# Patient Record
Sex: MALE | Race: WHITE | URBAN - METROPOLITAN AREA
[De-identification: names, ages, dates, MRNs, and addresses within clinical notes are randomized per-mention and may not be internally consistent; named-entity substitution may affect disease eponyms.]

---

## 2020-07-01 ENCOUNTER — OFFICE VISIT (OUTPATIENT)
Dept: URBAN - METROPOLITAN AREA CLINIC 27 | Facility: CLINIC | Age: 70
End: 2020-07-01

## 2020-07-10 ENCOUNTER — LAB OUTSIDE AN ENCOUNTER (OUTPATIENT)
Dept: URBAN - METROPOLITAN AREA CLINIC 121 | Facility: CLINIC | Age: 70
End: 2020-07-10

## 2020-07-10 LAB
BASOPH COUNT: (no result)
BASOPHIL %: 0.4
EOS COUNT: (no result)
EOSINOPHIL %: 1.6
HCT: 49.7
HGB: 16.7
HGBA1C: (no result)
LYMPHS %: 13.2
MCH: 29.1
MCHC: (no result)
MCV: 86.7
MONOCYTE %: 7.2
MONOSCT AUTO: (no result)
PLATELETS: (no result)
PMN %: 77.6
RBC: (no result)
RDW: 13.1
WBC: (no result)
ZZ-GE-UNK: (no result)

## 2020-07-17 ENCOUNTER — OFFICE VISIT (OUTPATIENT)
Dept: URBAN - METROPOLITAN AREA MEDICAL CENTER 8 | Facility: MEDICAL CENTER | Age: 70
End: 2020-07-17

## 2020-08-17 ENCOUNTER — OFFICE VISIT (OUTPATIENT)
Dept: URBAN - METROPOLITAN AREA CLINIC 27 | Facility: CLINIC | Age: 70
End: 2020-08-17

## 2021-08-28 ENCOUNTER — TELEPHONE ENCOUNTER (OUTPATIENT)
Dept: URBAN - METROPOLITAN AREA CLINIC 13 | Facility: CLINIC | Age: 71
End: 2021-08-28

## 2021-08-29 ENCOUNTER — TELEPHONE ENCOUNTER (OUTPATIENT)
Dept: URBAN - METROPOLITAN AREA CLINIC 13 | Facility: CLINIC | Age: 71
End: 2021-08-29

## 2022-04-30 ENCOUNTER — TELEPHONE ENCOUNTER (OUTPATIENT)
Dept: URBAN - METROPOLITAN AREA CLINIC 121 | Facility: CLINIC | Age: 72
End: 2022-04-30

## 2022-04-30 RX ORDER — OMEPRAZOLE 40 MG/1
1 CAPSULE PO QD CAPSULE, DELAYED RELEASE ORAL
OUTPATIENT
Start: 2014-02-25 | End: 2014-08-26

## 2022-04-30 RX ORDER — METOCLOPRAMIDE HYDROCHLORIDE 5 MG/5ML
SOLUTION ORAL
OUTPATIENT
Start: 2010-03-11

## 2022-04-30 RX ORDER — METOCLOPRAMIDE HYDROCHLORIDE 5 MG/5ML
SOLUTION ORAL
OUTPATIENT
Start: 2016-06-02

## 2022-04-30 RX ORDER — CHLORHEXIDINE GLUCONATE 4 %
1 TABLET PO BID LIQUID (ML) TOPICAL
OUTPATIENT
Start: 2014-04-08

## 2022-04-30 RX ORDER — OMEPRAZOLE 40 MG/1
1 CAPSULE PO QD CAPSULE, DELAYED RELEASE ORAL
OUTPATIENT
Start: 2014-02-25

## 2022-04-30 RX ORDER — ESOMEPRAZOLE MAGNESIUM 40 MG
TAKE 1 CAP PO QD CAPSULE,DELAYED RELEASE (ENTERIC COATED) ORAL
OUTPATIENT
Start: 2014-02-17 | End: 2014-08-26

## 2022-04-30 RX ORDER — CLOTRIMAZOLE AND BETAMETHASONE DIPROPIONATE 10; .5 MG/G; MG/G
CREAM TOPICAL
OUTPATIENT
Start: 2016-06-02 | End: 2019-03-21

## 2022-04-30 RX ORDER — SUCRALFATE 1 G/1
QID TABLET ORAL
OUTPATIENT
Start: 2013-03-21

## 2022-04-30 RX ORDER — CHLORHEXIDINE GLUCONATE 4 %
TAKE 1 TABLET BY MOUTH TWICE DAILY LIQUID (ML) TOPICAL
OUTPATIENT
Start: 2013-06-13 | End: 2014-08-26

## 2022-04-30 RX ORDER — PROMETHAZINE HYDROCHLORIDE 25 MG/1
1 TABLET PO Q 6 HOURS PRN TABLET ORAL
OUTPATIENT
Start: 2014-08-26 | End: 2014-09-23

## 2022-04-30 RX ORDER — PROMETHAZINE HYDROCHLORIDE 25 MG/1
1 TABLET PO Q6H PRN TABLET ORAL
OUTPATIENT
Start: 2015-07-21

## 2022-04-30 RX ORDER — METOCLOPRAMIDE HYDROCHLORIDE 5 MG/5ML
QAC SOLUTION ORAL
OUTPATIENT
Start: 2013-03-21

## 2022-04-30 RX ORDER — ESOMEPRAZOLE MAGNESIUM 40 MG
QD CAPSULE,DELAYED RELEASE (ENTERIC COATED) ORAL
OUTPATIENT
Start: 2013-03-21 | End: 2014-08-26

## 2022-04-30 RX ORDER — CHLORHEXIDINE GLUCONATE 4 %
1 TABLET PO BID LIQUID (ML) TOPICAL
OUTPATIENT
Start: 2014-04-08 | End: 2014-09-23

## 2022-04-30 RX ORDER — PROMETHAZINE HYDROCHLORIDE 25 MG/1
1 TABLET PO Q 6 HOURS PRN TABLET ORAL
OUTPATIENT
Start: 2014-08-26

## 2022-04-30 RX ORDER — ESOMEPRAZOLE MAGNESIUM 40 MG
QD CAPSULE,DELAYED RELEASE (ENTERIC COATED) ORAL
OUTPATIENT
Start: 2013-03-21

## 2022-04-30 RX ORDER — METOCLOPRAMIDE HYDROCHLORIDE 5 MG/5ML
QAC SOLUTION ORAL
OUTPATIENT
Start: 2013-03-21 | End: 2014-08-26

## 2022-04-30 RX ORDER — OMEPRAZOLE 40 MG/1
1 CAPSULE PO QD CAPSULE, DELAYED RELEASE ORAL
OUTPATIENT
Start: 2014-08-08

## 2022-04-30 RX ORDER — SUCRALFATE 1 G/1
QID TABLET ORAL
OUTPATIENT
Start: 2013-03-21 | End: 2014-09-23

## 2022-04-30 RX ORDER — OMEPRAZOLE 40 MG/1
1 CAPSULE PO QD CAPSULE, DELAYED RELEASE ORAL
OUTPATIENT
Start: 2014-08-08 | End: 2014-08-26

## 2022-04-30 RX ORDER — ESOMEPRAZOLE MAGNESIUM 40 MG
CAPSULE,DELAYED RELEASE (ENTERIC COATED) ORAL
OUTPATIENT
Start: 2010-03-11

## 2022-04-30 RX ORDER — CHLORHEXIDINE GLUCONATE 4 %
TAKE 1 TABLET BY MOUTH TWICE DAILY LIQUID (ML) TOPICAL
OUTPATIENT
Start: 2013-06-13

## 2022-04-30 RX ORDER — SUCRALFATE 1 G/1
TABLET ORAL
OUTPATIENT
Start: 2010-03-11

## 2022-04-30 RX ORDER — CLOTRIMAZOLE AND BETAMETHASONE DIPROPIONATE 10; .5 MG/G; MG/G
CREAM TOPICAL
OUTPATIENT
Start: 2016-06-02

## 2022-04-30 RX ORDER — CHLORHEXIDINE GLUCONATE 4 %
1 TABLET PO BID LIQUID (ML) TOPICAL
OUTPATIENT
Start: 2013-12-24 | End: 2014-08-26

## 2022-04-30 RX ORDER — CHLORHEXIDINE GLUCONATE 4 %
1 TABLET PO BID LIQUID (ML) TOPICAL
OUTPATIENT
Start: 2013-12-24

## 2022-04-30 RX ORDER — ESOMEPRAZOLE MAGNESIUM 40 MG
TAKE 1 CAP PO QD CAPSULE,DELAYED RELEASE (ENTERIC COATED) ORAL
OUTPATIENT
Start: 2014-02-17

## 2022-04-30 RX ORDER — BISOPROLOL FUMARATE 5 MG/1
TABLET ORAL
OUTPATIENT
Start: 2010-03-11

## 2022-05-01 ENCOUNTER — TELEPHONE ENCOUNTER (OUTPATIENT)
Dept: URBAN - METROPOLITAN AREA CLINIC 121 | Facility: CLINIC | Age: 72
End: 2022-05-01

## 2022-05-01 RX ORDER — PANTOPRAZOLE SODIUM 40 MG/1
TAKE 1 TABLET BY MOUTH TWICE A DAY TABLET, DELAYED RELEASE ORAL
Status: ACTIVE | COMMUNITY
Start: 2019-10-12

## 2022-05-01 RX ORDER — PROMETHAZINE HYDROCHLORIDE 25 MG/1
1 TABLET PO Q6H PRN TABLET ORAL
Status: ACTIVE | COMMUNITY
Start: 2015-07-21

## 2022-05-01 RX ORDER — SUCRALFATE 1 G/1
TABLET ORAL
Status: ACTIVE | COMMUNITY
Start: 2019-03-21

## 2022-05-01 RX ORDER — BISOPROLOL FUMARATE 5 MG/1
QD TABLET ORAL
Status: ACTIVE | COMMUNITY
Start: 2013-03-21

## 2022-05-01 RX ORDER — ELECTROLYTES/DEXTROSE
SOLUTION, ORAL ORAL
Status: ACTIVE | COMMUNITY
Start: 2016-06-02

## 2022-05-10 ENCOUNTER — CLAIMS CREATED FROM THE CLAIM WINDOW (OUTPATIENT)
Dept: URBAN - METROPOLITAN AREA MEDICAL CENTER 8 | Facility: MEDICAL CENTER | Age: 72
End: 2022-05-10
Payer: MEDICARE

## 2022-05-10 DIAGNOSIS — R93.3 ABN FINDINGS-GI TRACT: ICD-10-CM

## 2022-05-10 DIAGNOSIS — R19.7 ACUTE DIARRHEA: ICD-10-CM

## 2022-05-10 DIAGNOSIS — K63.89 BACTERIAL OVERGROWTH SYNDROME: ICD-10-CM

## 2022-05-10 DIAGNOSIS — D50.9 ANEMIA: ICD-10-CM

## 2022-05-10 DIAGNOSIS — R10.84 ABDOMINAL CRAMPING, GENERALIZED: ICD-10-CM

## 2022-05-10 PROCEDURE — 99221 1ST HOSP IP/OBS SF/LOW 40: CPT | Performed by: INTERNAL MEDICINE

## 2022-05-10 PROCEDURE — 99232 SBSQ HOSP IP/OBS MODERATE 35: CPT | Performed by: INTERNAL MEDICINE

## 2022-05-10 PROCEDURE — G8430 EC AT DOC MEDREC PT NOT ELIG: HCPCS | Performed by: INTERNAL MEDICINE

## 2022-05-12 ENCOUNTER — OUT OF OFFICE VISIT (OUTPATIENT)
Dept: URBAN - METROPOLITAN AREA MEDICAL CENTER 8 | Facility: MEDICAL CENTER | Age: 72
End: 2022-05-12
Payer: MEDICARE

## 2022-05-12 DIAGNOSIS — K63.89 BACTERIAL OVERGROWTH SYNDROME: ICD-10-CM

## 2022-05-12 DIAGNOSIS — D50.9 ANEMIA: ICD-10-CM

## 2022-05-12 PROCEDURE — 99232 SBSQ HOSP IP/OBS MODERATE 35: CPT | Performed by: INTERNAL MEDICINE

## 2022-05-13 ENCOUNTER — CLAIMS CREATED FROM THE CLAIM WINDOW (OUTPATIENT)
Dept: URBAN - METROPOLITAN AREA MEDICAL CENTER 8 | Facility: MEDICAL CENTER | Age: 72
End: 2022-05-13
Payer: MEDICARE

## 2022-05-13 DIAGNOSIS — K20.80 ESOPHAGITIS DISSECANS SUPERFICIALIS: ICD-10-CM

## 2022-05-13 DIAGNOSIS — K92.0 BLOODY EMESIS: ICD-10-CM

## 2022-05-13 DIAGNOSIS — K31.7 BENIGN GASTRIC POLYP: ICD-10-CM

## 2022-05-13 PROCEDURE — 43239 EGD BIOPSY SINGLE/MULTIPLE: CPT | Performed by: INTERNAL MEDICINE

## 2022-05-14 ENCOUNTER — CLAIMS CREATED FROM THE CLAIM WINDOW (OUTPATIENT)
Dept: URBAN - METROPOLITAN AREA MEDICAL CENTER 8 | Facility: MEDICAL CENTER | Age: 72
End: 2022-05-14
Payer: MEDICARE

## 2022-05-14 DIAGNOSIS — D50.9 ANEMIA: ICD-10-CM

## 2022-05-14 DIAGNOSIS — K20.80 ESOPHAGITIS DISSECANS SUPERFICIALIS: ICD-10-CM

## 2022-05-14 DIAGNOSIS — K63.89 BACTERIAL OVERGROWTH SYNDROME: ICD-10-CM

## 2022-05-14 PROCEDURE — 99232 SBSQ HOSP IP/OBS MODERATE 35: CPT | Performed by: INTERNAL MEDICINE

## 2022-05-26 ENCOUNTER — TELEPHONE ENCOUNTER (OUTPATIENT)
Dept: URBAN - METROPOLITAN AREA CLINIC 27 | Facility: CLINIC | Age: 72
End: 2022-05-26

## 2022-06-01 ENCOUNTER — OFFICE VISIT (OUTPATIENT)
Dept: URBAN - METROPOLITAN AREA CLINIC 27 | Facility: CLINIC | Age: 72
End: 2022-06-01
Payer: MEDICARE

## 2022-06-01 ENCOUNTER — LAB OUTSIDE AN ENCOUNTER (OUTPATIENT)
Dept: URBAN - METROPOLITAN AREA CLINIC 27 | Facility: CLINIC | Age: 72
End: 2022-06-01

## 2022-06-01 ENCOUNTER — WEB ENCOUNTER (OUTPATIENT)
Dept: URBAN - METROPOLITAN AREA CLINIC 27 | Facility: CLINIC | Age: 72
End: 2022-06-01

## 2022-06-01 VITALS
HEART RATE: 97 BPM | SYSTOLIC BLOOD PRESSURE: 99 MMHG | BODY MASS INDEX: 18.66 KG/M2 | TEMPERATURE: 97.1 F | RESPIRATION RATE: 18 BRPM | DIASTOLIC BLOOD PRESSURE: 75 MMHG | HEIGHT: 69 IN | WEIGHT: 126 LBS

## 2022-06-01 DIAGNOSIS — D50.8 ACQUIRED IRON DEFICIENCY ANEMIA DUE TO DECREASED ABSORPTION: ICD-10-CM

## 2022-06-01 DIAGNOSIS — K20.90 ESOPHAGITIS: ICD-10-CM

## 2022-06-01 DIAGNOSIS — K52.9 COLITIS: ICD-10-CM

## 2022-06-01 PROCEDURE — 99214 OFFICE O/P EST MOD 30 MIN: CPT | Performed by: INTERNAL MEDICINE

## 2022-06-01 RX ORDER — PROMETHAZINE HYDROCHLORIDE 25 MG/1
1 TABLET PO Q6H PRN TABLET ORAL
Status: ACTIVE | COMMUNITY
Start: 2015-07-21

## 2022-06-01 RX ORDER — BISOPROLOL FUMARATE 5 MG/1
QD TABLET ORAL
Status: ACTIVE | COMMUNITY
Start: 2013-03-21

## 2022-06-01 RX ORDER — SUCRALFATE 1 G/1
TABLET ORAL
Status: ACTIVE | COMMUNITY
Start: 2019-03-21

## 2022-06-01 RX ORDER — PANTOPRAZOLE SODIUM 40 MG/1
TAKE 1 TABLET BY MOUTH TWICE A DAY TABLET, DELAYED RELEASE ORAL
Status: ACTIVE | COMMUNITY
Start: 2019-10-12

## 2022-06-01 RX ORDER — ELECTROLYTES/DEXTROSE
SOLUTION, ORAL ORAL
Status: ACTIVE | COMMUNITY
Start: 2016-06-02

## 2022-06-01 NOTE — HPI-TODAY'S VISIT:
Patient is a 72 YOM established patient of Dr. Starkey presenting for follow up after hospitalization last month with marked iron deficiency anemia, acute diarrhea and colitis on CT (distal colon, rectum). He required transfusion of PRBCs; stool was Guaiac negative. He had no further diarrhea, so stool studies were not sent. He developed hematemesis and underwent EGD showing esophagitis. On discharge his Hgb was 10.9, and he was sent with PPI and PO iron with plans for outpt colonoscopy.  It has been 2 wks since discharge. He feels well but is tired. No diarrhea; he has been constipated, is using suppositories and laxatives. No melena or hematochezia. He is taking pantoprazole, Carafate and Reglan (?reason). Has not had significant N/V lately.   Colonoscopy 2016: suboptimal prep; no large lesions

## 2022-06-03 LAB
% SATURATION: 8
FERRITIN: 8
HEMATOCRIT: 42.3
HEMOGLOBIN: 12.4
IRON BINDING CAPACITY: 332
IRON, TOTAL: 26
MCH: 23.9
MCHC: 29.3
MCV: 81.7
MPV: 11.1
PLATELET COUNT: 366
RDW: 23.7
RED BLOOD CELL COUNT: 5.18
WHITE BLOOD CELL COUNT: 5.4

## 2022-06-27 PROBLEM — 87522002: Status: ACTIVE | Noted: 2022-06-01

## 2022-07-11 ENCOUNTER — OFFICE VISIT (OUTPATIENT)
Dept: URBAN - METROPOLITAN AREA MEDICAL CENTER 8 | Facility: MEDICAL CENTER | Age: 72
End: 2022-07-11

## 2022-07-13 ENCOUNTER — WEB ENCOUNTER (OUTPATIENT)
Dept: URBAN - METROPOLITAN AREA CLINIC 27 | Facility: CLINIC | Age: 72
End: 2022-07-13

## 2022-07-18 ENCOUNTER — OFFICE VISIT (OUTPATIENT)
Dept: URBAN - METROPOLITAN AREA MEDICAL CENTER 8 | Facility: MEDICAL CENTER | Age: 72
End: 2022-07-18
Payer: MEDICARE

## 2022-07-18 DIAGNOSIS — D50.9 ANEMIA: ICD-10-CM

## 2022-07-18 PROCEDURE — 45378 DIAGNOSTIC COLONOSCOPY: CPT | Performed by: INTERNAL MEDICINE

## 2022-07-18 RX ORDER — BISOPROLOL FUMARATE 5 MG/1
QD TABLET ORAL
Status: ACTIVE | COMMUNITY
Start: 2013-03-21

## 2022-07-18 RX ORDER — ELECTROLYTES/DEXTROSE
SOLUTION, ORAL ORAL
Status: ACTIVE | COMMUNITY
Start: 2016-06-02

## 2022-07-18 RX ORDER — PANTOPRAZOLE SODIUM 40 MG/1
TAKE 1 TABLET BY MOUTH TWICE A DAY TABLET, DELAYED RELEASE ORAL
Status: ACTIVE | COMMUNITY
Start: 2019-10-12

## 2022-07-18 RX ORDER — PROMETHAZINE HYDROCHLORIDE 25 MG/1
1 TABLET PO Q6H PRN TABLET ORAL
Status: ACTIVE | COMMUNITY
Start: 2015-07-21

## 2022-07-18 RX ORDER — SUCRALFATE 1 G/1
TABLET ORAL
Status: ACTIVE | COMMUNITY
Start: 2019-03-21

## 2022-08-01 ENCOUNTER — WEB ENCOUNTER (OUTPATIENT)
Dept: URBAN - METROPOLITAN AREA CLINIC 27 | Facility: CLINIC | Age: 72
End: 2022-08-01

## 2022-08-20 ENCOUNTER — OUT OF OFFICE VISIT (OUTPATIENT)
Dept: URBAN - METROPOLITAN AREA MEDICAL CENTER 12 | Facility: MEDICAL CENTER | Age: 72
End: 2022-08-20
Payer: MEDICARE

## 2022-08-20 DIAGNOSIS — R93.3 ABN FINDINGS-GI TRACT: ICD-10-CM

## 2022-08-20 DIAGNOSIS — A41.89 OTHER SPECIFIED SEPSIS: ICD-10-CM

## 2022-08-20 DIAGNOSIS — K92.0 BLOODY EMESIS: ICD-10-CM

## 2022-08-20 DIAGNOSIS — K31.84 DIABETIC GASTROPARESIS: ICD-10-CM

## 2022-08-20 PROCEDURE — G8427 DOCREV CUR MEDS BY ELIG CLIN: HCPCS | Performed by: INTERNAL MEDICINE

## 2022-08-20 PROCEDURE — 99232 SBSQ HOSP IP/OBS MODERATE 35: CPT | Performed by: INTERNAL MEDICINE

## 2022-08-20 PROCEDURE — 99222 1ST HOSP IP/OBS MODERATE 55: CPT | Performed by: INTERNAL MEDICINE

## 2022-08-22 ENCOUNTER — OUT OF OFFICE VISIT (OUTPATIENT)
Dept: URBAN - METROPOLITAN AREA MEDICAL CENTER 12 | Facility: MEDICAL CENTER | Age: 72
End: 2022-08-22
Payer: MEDICARE

## 2022-08-22 DIAGNOSIS — K31.89 ACQUIRED DEFORMITY OF DUODENUM: ICD-10-CM

## 2022-08-22 DIAGNOSIS — K21.00 ALKALINE REFLUX ESOPHAGITIS: ICD-10-CM

## 2022-08-22 DIAGNOSIS — K92.0 BLOODY EMESIS: ICD-10-CM

## 2022-08-22 PROCEDURE — 43239 EGD BIOPSY SINGLE/MULTIPLE: CPT | Performed by: INTERNAL MEDICINE

## 2022-08-22 PROCEDURE — 43251 EGD REMOVE LESION SNARE: CPT | Performed by: INTERNAL MEDICINE

## 2022-08-23 ENCOUNTER — OUT OF OFFICE VISIT (OUTPATIENT)
Dept: URBAN - METROPOLITAN AREA MEDICAL CENTER 12 | Facility: MEDICAL CENTER | Age: 72
End: 2022-08-23
Payer: MEDICARE

## 2022-08-23 DIAGNOSIS — K92.0 BLOODY EMESIS: ICD-10-CM

## 2022-08-23 DIAGNOSIS — A41.89 OTHER SPECIFIED SEPSIS: ICD-10-CM

## 2022-08-23 DIAGNOSIS — K31.84 DIABETIC GASTROPARESIS: ICD-10-CM

## 2022-08-23 DIAGNOSIS — K20.80 ESOPHAGITIS DISSECANS SUPERFICIALIS: ICD-10-CM

## 2022-08-23 PROCEDURE — 99233 SBSQ HOSP IP/OBS HIGH 50: CPT | Performed by: PHYSICIAN ASSISTANT

## 2022-09-01 ENCOUNTER — WEB ENCOUNTER (OUTPATIENT)
Dept: URBAN - METROPOLITAN AREA CLINIC 27 | Facility: CLINIC | Age: 72
End: 2022-09-01

## 2022-09-02 ENCOUNTER — LAB OUTSIDE AN ENCOUNTER (OUTPATIENT)
Dept: URBAN - METROPOLITAN AREA CLINIC 27 | Facility: CLINIC | Age: 72
End: 2022-09-02

## 2022-09-02 ENCOUNTER — OFFICE VISIT (OUTPATIENT)
Dept: URBAN - METROPOLITAN AREA CLINIC 27 | Facility: CLINIC | Age: 72
End: 2022-09-02
Payer: MEDICARE

## 2022-09-02 ENCOUNTER — TELEPHONE ENCOUNTER (OUTPATIENT)
Dept: URBAN - METROPOLITAN AREA CLINIC 27 | Facility: CLINIC | Age: 72
End: 2022-09-02

## 2022-09-02 VITALS
RESPIRATION RATE: 18 BRPM | TEMPERATURE: 96.6 F | DIASTOLIC BLOOD PRESSURE: 62 MMHG | HEIGHT: 69 IN | BODY MASS INDEX: 18.66 KG/M2 | SYSTOLIC BLOOD PRESSURE: 87 MMHG | HEART RATE: 97 BPM | WEIGHT: 126 LBS

## 2022-09-02 DIAGNOSIS — K25.9 GASTRIC ULCER, UNSPECIFIED AS ACUTE OR CHRONIC, WITHOUT HEMORRHAGE OR PERFORATION: ICD-10-CM

## 2022-09-02 DIAGNOSIS — K21.9 GASTRO-ESOPHAGEAL REFLUX DISEASE WITHOUT ESOPHAGITIS: ICD-10-CM

## 2022-09-02 DIAGNOSIS — K59.00 CONSTIPATION, UNSPECIFIED: ICD-10-CM

## 2022-09-02 DIAGNOSIS — G80.9 CEREBRAL PALSY, UNSPECIFIED: ICD-10-CM

## 2022-09-02 PROBLEM — 14760008 CONSTIPATION: Status: ACTIVE | Noted: 2020-08-17

## 2022-09-02 PROCEDURE — 99214 OFFICE O/P EST MOD 30 MIN: CPT | Performed by: INTERNAL MEDICINE

## 2022-09-02 RX ORDER — PROMETHAZINE HYDROCHLORIDE 25 MG/1
1 TABLET PO Q6H PRN TABLET ORAL
Status: ACTIVE | COMMUNITY
Start: 2015-07-21

## 2022-09-02 RX ORDER — SUCRALFATE 1 G/1
TABLET ORAL
Status: ACTIVE | COMMUNITY
Start: 2019-03-21

## 2022-09-02 RX ORDER — ELECTROLYTES/DEXTROSE
SOLUTION, ORAL ORAL
Status: ACTIVE | COMMUNITY
Start: 2016-06-02

## 2022-09-02 RX ORDER — BISOPROLOL FUMARATE 5 MG/1
QD TABLET ORAL
Status: ACTIVE | COMMUNITY
Start: 2013-03-21

## 2022-09-02 RX ORDER — PANTOPRAZOLE SODIUM 40 MG/1
TAKE 1 TABLET BY MOUTH TWICE A DAY TABLET, DELAYED RELEASE ORAL
Status: ACTIVE | COMMUNITY
Start: 2019-10-12

## 2022-09-02 NOTE — HPI-TODAY'S VISIT:
This is a 72-year-old female seen in follow-up consultation after recent discharge from [facility].  She had vomiting and underwent upper endoscopy which revealed esophagitis and a large gastric polyp which was removed.  It was suggested to undergo repeat endoscopy to ensure healing.  There was an question of darker stools recently.  Only 1 bowel movement a day.  Was also obstipated but now back to baseline and no longer using suppositories

## 2022-09-03 ENCOUNTER — LAB OUTSIDE AN ENCOUNTER (OUTPATIENT)
Dept: URBAN - METROPOLITAN AREA CLINIC 27 | Facility: CLINIC | Age: 72
End: 2022-09-03

## 2022-09-03 LAB
ABSOLUTE BASOPHILS: 21
ABSOLUTE EOSINOPHILS: 101
ABSOLUTE LYMPHOCYTES: 848
ABSOLUTE MONOCYTES: 610
ABSOLUTE NEUTROPHILS: 3721
BASOPHILS: 0.4
EOSINOPHILS: 1.9
FERRITIN, SERUM: 8
FOLATE (FOLIC ACID), SERUM: >24
HEMATOCRIT: 32.8
HEMOGLOBIN: 10.2
IRON BIND.CAP.(TIBC): 358
IRON SATURATION: 5
IRON: 17
LYMPHOCYTES: 16
MCH: 25.7
MCHC: 31.1
MCV: 82.6
MONOCYTES: 11.5
MPV: 10.4
NEUTROPHILS: 70.2
PLATELET COUNT: 500
RDW: 14.9
RED BLOOD CELL COUNT: 3.97
RETICULOCYTE COUNT: 1.4
RETICULOCYTE, ABSOLUTE: (no result)
VITAMIN B12: 678
WHITE BLOOD CELL COUNT: 5.3

## 2022-09-06 ENCOUNTER — OUT OF OFFICE VISIT (OUTPATIENT)
Dept: URBAN - METROPOLITAN AREA MEDICAL CENTER 8 | Facility: MEDICAL CENTER | Age: 72
End: 2022-09-06
Payer: MEDICARE

## 2022-09-06 ENCOUNTER — TELEPHONE ENCOUNTER (OUTPATIENT)
Dept: URBAN - METROPOLITAN AREA CLINIC 27 | Facility: CLINIC | Age: 72
End: 2022-09-06

## 2022-09-06 DIAGNOSIS — K92.0 BLOODY EMESIS: ICD-10-CM

## 2022-09-06 DIAGNOSIS — K92.1 ACUTE MELENA: ICD-10-CM

## 2022-09-06 DIAGNOSIS — D50.0 ANEMIA: ICD-10-CM

## 2022-09-06 DIAGNOSIS — R10.31 ABDOMINAL CRAMPING IN RIGHT LOWER QUADRANT: ICD-10-CM

## 2022-09-06 PROCEDURE — 99232 SBSQ HOSP IP/OBS MODERATE 35: CPT | Performed by: INTERNAL MEDICINE

## 2022-09-06 PROCEDURE — 99222 1ST HOSP IP/OBS MODERATE 55: CPT | Performed by: INTERNAL MEDICINE

## 2022-09-07 LAB — OCCULT BLOOD, FECAL, IA: (no result)

## 2022-09-08 ENCOUNTER — OUT OF OFFICE VISIT (OUTPATIENT)
Dept: URBAN - METROPOLITAN AREA MEDICAL CENTER 8 | Facility: MEDICAL CENTER | Age: 72
End: 2022-09-08
Payer: MEDICARE

## 2022-09-08 DIAGNOSIS — K92.2 ACUTE GASTROINTESTINAL BLEEDING: ICD-10-CM

## 2022-09-08 DIAGNOSIS — K22.70 BARRETT ESOPHAGUS: ICD-10-CM

## 2022-09-08 PROCEDURE — 43239 EGD BIOPSY SINGLE/MULTIPLE: CPT | Performed by: INTERNAL MEDICINE

## 2022-09-09 ENCOUNTER — OUT OF OFFICE VISIT (OUTPATIENT)
Dept: URBAN - METROPOLITAN AREA MEDICAL CENTER 8 | Facility: MEDICAL CENTER | Age: 72
End: 2022-09-09
Payer: MEDICARE

## 2022-09-09 DIAGNOSIS — K92.0 BLOODY EMESIS: ICD-10-CM

## 2022-09-09 DIAGNOSIS — D50.0 ANEMIA: ICD-10-CM

## 2022-09-09 PROCEDURE — 99231 SBSQ HOSP IP/OBS SF/LOW 25: CPT | Performed by: INTERNAL MEDICINE

## 2022-09-14 ENCOUNTER — TELEPHONE ENCOUNTER (OUTPATIENT)
Dept: URBAN - METROPOLITAN AREA CLINIC 27 | Facility: CLINIC | Age: 72
End: 2022-09-14

## 2022-10-06 ENCOUNTER — OFFICE VISIT (OUTPATIENT)
Dept: URBAN - METROPOLITAN AREA MEDICAL CENTER 8 | Facility: MEDICAL CENTER | Age: 72
End: 2022-10-06

## 2022-10-10 ENCOUNTER — TELEPHONE ENCOUNTER (OUTPATIENT)
Dept: URBAN - METROPOLITAN AREA CLINIC 27 | Facility: CLINIC | Age: 72
End: 2022-10-10

## 2022-10-19 ENCOUNTER — CLAIMS CREATED FROM THE CLAIM WINDOW (OUTPATIENT)
Dept: URBAN - METROPOLITAN AREA MEDICAL CENTER 8 | Facility: MEDICAL CENTER | Age: 72
End: 2022-10-19
Payer: MEDICARE

## 2022-10-19 ENCOUNTER — CLAIMS CREATED FROM THE CLAIM WINDOW (OUTPATIENT)
Dept: URBAN - METROPOLITAN AREA MEDICAL CENTER 8 | Facility: MEDICAL CENTER | Age: 72
End: 2022-10-19

## 2022-10-19 DIAGNOSIS — D62 ABLA (ACUTE BLOOD LOSS ANEMIA): ICD-10-CM

## 2022-10-19 DIAGNOSIS — R10.84 ABDOMINAL CRAMPING, GENERALIZED: ICD-10-CM

## 2022-10-19 DIAGNOSIS — K92.0 BLOODY EMESIS: ICD-10-CM

## 2022-10-19 DIAGNOSIS — R19.5 ABNORMAL CONSISTENCY OF STOOL: ICD-10-CM

## 2022-10-19 PROCEDURE — G8427 DOCREV CUR MEDS BY ELIG CLIN: HCPCS | Performed by: INTERNAL MEDICINE

## 2022-10-19 PROCEDURE — 99222 1ST HOSP IP/OBS MODERATE 55: CPT | Performed by: INTERNAL MEDICINE

## 2022-10-20 ENCOUNTER — OUT OF OFFICE VISIT (OUTPATIENT)
Dept: URBAN - METROPOLITAN AREA MEDICAL CENTER 8 | Facility: MEDICAL CENTER | Age: 72
End: 2022-10-20
Payer: MEDICARE

## 2022-10-20 DIAGNOSIS — K92.0 BLOODY EMESIS: ICD-10-CM

## 2022-10-20 DIAGNOSIS — K20.80 ESOPHAGITIS DISSECANS SUPERFICIALIS: ICD-10-CM

## 2022-10-20 PROCEDURE — 43239 EGD BIOPSY SINGLE/MULTIPLE: CPT | Performed by: INTERNAL MEDICINE

## 2022-10-21 ENCOUNTER — WEB ENCOUNTER (OUTPATIENT)
Dept: URBAN - METROPOLITAN AREA CLINIC 27 | Facility: CLINIC | Age: 72
End: 2022-10-21

## 2022-10-21 ENCOUNTER — OUT OF OFFICE VISIT (OUTPATIENT)
Dept: URBAN - METROPOLITAN AREA MEDICAL CENTER 8 | Facility: MEDICAL CENTER | Age: 72
End: 2022-10-21
Payer: MEDICARE

## 2022-10-21 DIAGNOSIS — K92.0 BLOODY EMESIS: ICD-10-CM

## 2022-10-21 DIAGNOSIS — D64.89 ANEMIA DUE TO OTHER CAUSE: ICD-10-CM

## 2022-10-21 PROCEDURE — 99232 SBSQ HOSP IP/OBS MODERATE 35: CPT | Performed by: INTERNAL MEDICINE

## 2022-10-24 ENCOUNTER — TELEPHONE ENCOUNTER (OUTPATIENT)
Dept: URBAN - METROPOLITAN AREA CLINIC 27 | Facility: CLINIC | Age: 72
End: 2022-10-24

## 2022-10-25 ENCOUNTER — WEB ENCOUNTER (OUTPATIENT)
Dept: URBAN - METROPOLITAN AREA CLINIC 27 | Facility: CLINIC | Age: 72
End: 2022-10-25

## 2022-11-16 ENCOUNTER — CLAIMS CREATED FROM THE CLAIM WINDOW (OUTPATIENT)
Dept: URBAN - METROPOLITAN AREA CLINIC 27 | Facility: CLINIC | Age: 72
End: 2022-11-16

## 2022-11-16 ENCOUNTER — OFFICE VISIT (OUTPATIENT)
Dept: URBAN - METROPOLITAN AREA CLINIC 27 | Facility: CLINIC | Age: 72
End: 2022-11-16

## 2022-11-16 ENCOUNTER — CLAIMS CREATED FROM THE CLAIM WINDOW (OUTPATIENT)
Dept: URBAN - METROPOLITAN AREA CLINIC 27 | Facility: CLINIC | Age: 72
End: 2022-11-16
Payer: MEDICARE

## 2022-11-16 DIAGNOSIS — D62 ACUTE BLOOD LOSS ANEMIA: ICD-10-CM

## 2022-11-16 DIAGNOSIS — K92.2: ICD-10-CM

## 2022-11-16 PROCEDURE — 91110 GI TRC IMG INTRAL ESOPH-ILE: CPT | Performed by: INTERNAL MEDICINE

## 2022-11-16 RX ORDER — BISOPROLOL FUMARATE 5 MG/1
QD TABLET ORAL
Status: ACTIVE | COMMUNITY
Start: 2013-03-21

## 2022-11-16 RX ORDER — ELECTROLYTES/DEXTROSE
SOLUTION, ORAL ORAL
Status: ACTIVE | COMMUNITY
Start: 2016-06-02

## 2022-11-16 RX ORDER — PANTOPRAZOLE SODIUM 40 MG/1
TAKE 1 TABLET BY MOUTH TWICE A DAY TABLET, DELAYED RELEASE ORAL
Status: ACTIVE | COMMUNITY
Start: 2019-10-12

## 2022-11-16 RX ORDER — PROMETHAZINE HYDROCHLORIDE 25 MG/1
1 TABLET PO Q6H PRN TABLET ORAL
Status: ACTIVE | COMMUNITY
Start: 2015-07-21

## 2022-11-16 RX ORDER — SUCRALFATE 1 G/1
TABLET ORAL
Status: ACTIVE | COMMUNITY
Start: 2019-03-21

## 2022-11-18 ENCOUNTER — TELEPHONE ENCOUNTER (OUTPATIENT)
Dept: URBAN - METROPOLITAN AREA CLINIC 27 | Facility: CLINIC | Age: 72
End: 2022-11-18

## 2022-11-22 ENCOUNTER — TELEPHONE ENCOUNTER (OUTPATIENT)
Dept: URBAN - METROPOLITAN AREA CLINIC 27 | Facility: CLINIC | Age: 72
End: 2022-11-22

## 2022-11-23 ENCOUNTER — OUT OF OFFICE VISIT (OUTPATIENT)
Dept: URBAN - METROPOLITAN AREA MEDICAL CENTER 8 | Facility: MEDICAL CENTER | Age: 72
End: 2022-11-23
Payer: MEDICARE

## 2022-11-23 DIAGNOSIS — K92.0 BLOODY EMESIS: ICD-10-CM

## 2022-11-23 DIAGNOSIS — D62 ABLA (ACUTE BLOOD LOSS ANEMIA): ICD-10-CM

## 2022-11-23 DIAGNOSIS — K92.1 ACUTE MELENA: ICD-10-CM

## 2022-11-23 DIAGNOSIS — D72.829 ELEVATED WBC COUNT: ICD-10-CM

## 2022-11-23 PROCEDURE — 99222 1ST HOSP IP/OBS MODERATE 55: CPT | Performed by: INTERNAL MEDICINE

## 2022-11-23 PROCEDURE — G8427 DOCREV CUR MEDS BY ELIG CLIN: HCPCS | Performed by: INTERNAL MEDICINE

## 2022-11-29 ENCOUNTER — CLAIMS CREATED FROM THE CLAIM WINDOW (OUTPATIENT)
Dept: URBAN - METROPOLITAN AREA CLINIC 27 | Facility: CLINIC | Age: 72
End: 2022-11-29

## 2022-11-29 ENCOUNTER — TELEPHONE ENCOUNTER (OUTPATIENT)
Dept: URBAN - METROPOLITAN AREA CLINIC 27 | Facility: CLINIC | Age: 72
End: 2022-11-29

## 2022-11-29 ENCOUNTER — OFFICE VISIT (OUTPATIENT)
Dept: URBAN - METROPOLITAN AREA CLINIC 27 | Facility: CLINIC | Age: 72
End: 2022-11-29

## 2022-11-29 ENCOUNTER — CLAIMS CREATED FROM THE CLAIM WINDOW (OUTPATIENT)
Dept: URBAN - METROPOLITAN AREA CLINIC 27 | Facility: CLINIC | Age: 72
End: 2022-11-29
Payer: MEDICARE

## 2022-11-29 VITALS — HEIGHT: 69 IN

## 2022-11-29 DIAGNOSIS — D62 ANEMIA ASSOCIATED WITH ACUTE BLOOD LOSS: ICD-10-CM

## 2022-11-29 DIAGNOSIS — K92.2 ACUTE GI BLEEDING: ICD-10-CM

## 2022-11-29 PROCEDURE — 91110 GI TRC IMG INTRAL ESOPH-ILE: CPT | Performed by: INTERNAL MEDICINE

## 2022-11-29 RX ORDER — PANTOPRAZOLE SODIUM 40 MG/1
TAKE 1 TABLET BY MOUTH TWICE A DAY TABLET, DELAYED RELEASE ORAL
Status: ACTIVE | COMMUNITY
Start: 2019-10-12

## 2022-11-29 RX ORDER — PROMETHAZINE HYDROCHLORIDE 25 MG/1
1 TABLET PO Q6H PRN TABLET ORAL
Status: ACTIVE | COMMUNITY
Start: 2015-07-21

## 2022-11-29 RX ORDER — SUCRALFATE 1 G/1
TABLET ORAL
Status: ACTIVE | COMMUNITY
Start: 2019-03-21

## 2022-11-29 RX ORDER — ELECTROLYTES/DEXTROSE
SOLUTION, ORAL ORAL
Status: ACTIVE | COMMUNITY
Start: 2016-06-02

## 2022-11-29 RX ORDER — BISOPROLOL FUMARATE 5 MG/1
QD TABLET ORAL
Status: ACTIVE | COMMUNITY
Start: 2013-03-21

## 2022-12-02 ENCOUNTER — OFFICE VISIT (OUTPATIENT)
Dept: URBAN - METROPOLITAN AREA CLINIC 27 | Facility: CLINIC | Age: 72
End: 2022-12-02
Payer: MEDICARE

## 2022-12-02 VITALS
WEIGHT: 126 LBS | BODY MASS INDEX: 18.66 KG/M2 | SYSTOLIC BLOOD PRESSURE: 107 MMHG | RESPIRATION RATE: 17 BRPM | HEART RATE: 97 BPM | HEIGHT: 69 IN | DIASTOLIC BLOOD PRESSURE: 70 MMHG

## 2022-12-02 DIAGNOSIS — K20.90 ESOPHAGITIS: ICD-10-CM

## 2022-12-02 DIAGNOSIS — K21.9 GASTRO-ESOPHAGEAL REFLUX DISEASE WITHOUT ESOPHAGITIS: ICD-10-CM

## 2022-12-02 DIAGNOSIS — D62 ANEMIA ASSOCIATED WITH ACUTE BLOOD LOSS: ICD-10-CM

## 2022-12-02 DIAGNOSIS — G80.9 CEREBRAL PALSY, UNSPECIFIED: ICD-10-CM

## 2022-12-02 PROCEDURE — 99214 OFFICE O/P EST MOD 30 MIN: CPT | Performed by: INTERNAL MEDICINE

## 2022-12-02 RX ORDER — SUCRALFATE 1 G/1
TABLET ORAL
Status: ACTIVE | COMMUNITY
Start: 2019-03-21

## 2022-12-02 RX ORDER — BISOPROLOL FUMARATE 5 MG/1
QD TABLET ORAL
Status: ACTIVE | COMMUNITY
Start: 2013-03-21

## 2022-12-02 RX ORDER — ELECTROLYTES/DEXTROSE
SOLUTION, ORAL ORAL
Status: ACTIVE | COMMUNITY
Start: 2016-06-02

## 2022-12-02 RX ORDER — PANTOPRAZOLE SODIUM 40 MG/1
TAKE 1 TABLET BY MOUTH TWICE A DAY TABLET, DELAYED RELEASE ORAL
Status: ACTIVE | COMMUNITY
Start: 2019-10-12

## 2022-12-02 RX ORDER — PROMETHAZINE HYDROCHLORIDE 25 MG/1
1 TABLET PO Q6H PRN TABLET ORAL
Status: ACTIVE | COMMUNITY
Start: 2015-07-21

## 2022-12-02 NOTE — PHYSICAL EXAM CONSTITUTIONAL:
pleasant, well nourished, well developed, in no acute distress , normal communication ability , confined to a wheelchair

## 2022-12-02 NOTE — HPI-TODAY'S VISIT:
Ms. Fernandez is seen in follow-up consultation today for the anemia and history of GI bleed.  She is accompanied by her friend today.  She is in a wheelchair without complaints.  She is feeling well.  She has been tapering the Reglan.  She is taking Protonix twice a day.  She previously had esophagitis and apparently last week had an episode of hematemesis and was sent to the hospital.  However the hemoglobin was stable and she was discharged.  Recent capsule endoscopy was negative.  Colonoscopy was unrevealing and upper endoscopy revealed grade D esophagitis Enrique's and a clip over a previously removed antral polyp.  No melena in the last 5 to 6 days.

## 2022-12-05 PROBLEM — 16761005: Status: ACTIVE | Noted: 2022-06-01

## 2022-12-05 PROBLEM — 266435005 GASTRO-ESOPHAGEAL REFLUX DISEASE WITHOUT ESOPHAGITIS: Status: ACTIVE | Noted: 2020-08-17

## 2022-12-14 ENCOUNTER — OFFICE VISIT (OUTPATIENT)
Dept: URBAN - METROPOLITAN AREA CLINIC 27 | Facility: CLINIC | Age: 72
End: 2022-12-14

## 2023-02-24 PROBLEM — 267530009 ACUTE POSTHEMORRHAGIC ANEMIA: Status: ACTIVE | Noted: 2022-12-05

## 2023-03-15 ENCOUNTER — TELEPHONE ENCOUNTER (OUTPATIENT)
Dept: URBAN - METROPOLITAN AREA CLINIC 27 | Facility: CLINIC | Age: 73
End: 2023-03-15

## 2023-03-15 ENCOUNTER — OFFICE VISIT (OUTPATIENT)
Dept: URBAN - METROPOLITAN AREA CLINIC 27 | Facility: CLINIC | Age: 73
End: 2023-03-15
Payer: MEDICARE

## 2023-03-15 VITALS — BODY MASS INDEX: 18.66 KG/M2 | HEIGHT: 69 IN | RESPIRATION RATE: 16 BRPM | WEIGHT: 126 LBS

## 2023-03-15 DIAGNOSIS — D62 ANEMIA ASSOCIATED WITH ACUTE BLOOD LOSS: ICD-10-CM

## 2023-03-15 DIAGNOSIS — K25.9 GASTRIC ULCER, UNSPECIFIED AS ACUTE OR CHRONIC, WITHOUT HEMORRHAGE OR PERFORATION: ICD-10-CM

## 2023-03-15 DIAGNOSIS — G80.9 CEREBRAL PALSY, UNSPECIFIED: ICD-10-CM

## 2023-03-15 DIAGNOSIS — K59.00 CONSTIPATION, UNSPECIFIED: ICD-10-CM

## 2023-03-15 DIAGNOSIS — K21.9 GASTRO-ESOPHAGEAL REFLUX DISEASE WITHOUT ESOPHAGITIS: ICD-10-CM

## 2023-03-15 DIAGNOSIS — K20.90 ESOPHAGITIS: ICD-10-CM

## 2023-03-15 PROCEDURE — 99214 OFFICE O/P EST MOD 30 MIN: CPT | Performed by: INTERNAL MEDICINE

## 2023-03-15 RX ORDER — PROMETHAZINE HYDROCHLORIDE 25 MG/1
1 TABLET PO Q6H PRN TABLET ORAL
Status: ACTIVE | COMMUNITY
Start: 2015-07-21

## 2023-03-15 RX ORDER — SUCRALFATE 1 G/1
TABLET ORAL
Status: ACTIVE | COMMUNITY
Start: 2019-03-21

## 2023-03-15 RX ORDER — ELECTROLYTES/DEXTROSE
SOLUTION, ORAL ORAL
Status: ACTIVE | COMMUNITY
Start: 2016-06-02

## 2023-03-15 RX ORDER — PANTOPRAZOLE SODIUM 40 MG/1
TAKE 1 TABLET BY MOUTH TWICE A DAY TABLET, DELAYED RELEASE ORAL
Status: ACTIVE | COMMUNITY
Start: 2019-10-12

## 2023-03-15 RX ORDER — BISOPROLOL FUMARATE 5 MG/1
QD TABLET ORAL
Status: ACTIVE | COMMUNITY
Start: 2013-03-21

## 2023-03-15 NOTE — HPI-TODAY'S VISIT:
This is a 73-year-old patient seen in consultation for an episode of nausea and vomiting on Monday.  His caretaker states that she was doing well for about 2 months taking Protonix twice a day and sucralfate but has not been eating well eating hamburgers and other foods.  Today she states that she has not had another episode but did run out of sucralfate.  Her caretaker thinks that she also may be anemic again.  She was admitted at [place] in December and underwent an enteroscopy that did not show any AVMs but the esophagitis remained.  She does occasionally need transfusions although no other source of bleeding has been identified.

## 2023-03-16 ENCOUNTER — LAB OUTSIDE AN ENCOUNTER (OUTPATIENT)
Dept: URBAN - METROPOLITAN AREA CLINIC 27 | Facility: CLINIC | Age: 73
End: 2023-03-16

## 2023-03-16 ENCOUNTER — TELEPHONE ENCOUNTER (OUTPATIENT)
Dept: URBAN - METROPOLITAN AREA CLINIC 27 | Facility: CLINIC | Age: 73
End: 2023-03-16

## 2023-03-16 RX ORDER — SUCRALFATE 1 G/1
1 TABLET ON AN EMPTY STOMACH TABLET ORAL TWICE A DAY
Qty: 180 TABLET | Refills: 0
Start: 2019-03-21 | End: 2023-06-14

## 2023-03-16 RX ORDER — PANTOPRAZOLE SODIUM 40 MG/1
TAKE 1 TABLET BY MOUTH TWICE A DAY TABLET, DELAYED RELEASE ORAL ONCE A DAY
Qty: 30 | Refills: 3
Start: 2019-10-12

## 2023-03-17 LAB
A/G RATIO: 1.6
ABSOLUTE BASOPHILS: 46
ABSOLUTE EOSINOPHILS: 191
ABSOLUTE LYMPHOCYTES: 1101
ABSOLUTE MONOCYTES: 582
ABSOLUTE NEUTROPHILS: 7180
ALBUMIN: 3.9
ALKALINE PHOSPHATASE: 77
ALT (SGPT): 8
AST (SGOT): 12
BASOPHILS: 0.5
BILIRUBIN, TOTAL: 0.3
BUN/CREATININE RATIO: (no result)
BUN: 13
CALCIUM: 9.5
CARBON DIOXIDE, TOTAL: 26
CHLORIDE: 104
CREATININE: 0.82
EGFR: 93
EOSINOPHILS: 2.1
FERRITIN, SERUM: 12
GLOBULIN, TOTAL: 2.5
GLUCOSE: 109
HEMATOCRIT: 42.8
HEMOGLOBIN: 13.4
IRON BIND.CAP.(TIBC): 331
IRON SATURATION: 22
IRON: 74
LYMPHOCYTES: 12.1
MCH: 26.1
MCHC: 31.3
MCV: 83.3
MONOCYTES: 6.4
MPV: 10.7
NEUTROPHILS: 78.9
PLATELET COUNT: 435
POTASSIUM: 4.5
PROTEIN, TOTAL: 6.4
RDW: 18.4
RED BLOOD CELL COUNT: 5.14
SODIUM: 138
WHITE BLOOD CELL COUNT: 9.1

## 2023-04-19 ENCOUNTER — TELEPHONE ENCOUNTER (OUTPATIENT)
Dept: URBAN - METROPOLITAN AREA CLINIC 27 | Facility: CLINIC | Age: 73
End: 2023-04-19

## 2023-04-19 RX ORDER — PANTOPRAZOLE SODIUM 40 MG/1
TAKE 1 TABLET BY MOUTH TABLET, DELAYED RELEASE ORAL TWICE A DAY
Qty: 60 | Refills: 3
Start: 2019-10-12

## 2023-05-25 ENCOUNTER — TELEPHONE ENCOUNTER (OUTPATIENT)
Dept: URBAN - METROPOLITAN AREA CLINIC 27 | Facility: CLINIC | Age: 73
End: 2023-05-25

## 2023-05-25 RX ORDER — SUCRALFATE 1 G/1
1 TABLET ON AN EMPTY STOMACH TABLET ORAL TWICE A DAY
Qty: 180 | Refills: 0
Start: 2019-03-21 | End: 2023-08-23

## 2023-07-26 ENCOUNTER — OFFICE VISIT (OUTPATIENT)
Dept: URBAN - METROPOLITAN AREA CLINIC 27 | Facility: CLINIC | Age: 73
End: 2023-07-26
Payer: MEDICARE

## 2023-07-26 ENCOUNTER — LAB OUTSIDE AN ENCOUNTER (OUTPATIENT)
Dept: URBAN - METROPOLITAN AREA CLINIC 27 | Facility: CLINIC | Age: 73
End: 2023-07-26

## 2023-07-26 DIAGNOSIS — K21.9 GASTRO-ESOPHAGEAL REFLUX DISEASE WITHOUT ESOPHAGITIS: ICD-10-CM

## 2023-07-26 DIAGNOSIS — K92.2 UPPER GI BLEED: ICD-10-CM

## 2023-07-26 DIAGNOSIS — K20.90 ESOPHAGITIS: ICD-10-CM

## 2023-07-26 PROBLEM — 37372002: Status: ACTIVE | Noted: 2023-07-26

## 2023-07-26 PROCEDURE — 99214 OFFICE O/P EST MOD 30 MIN: CPT | Performed by: PHYSICIAN ASSISTANT

## 2023-07-26 RX ORDER — SUCRALFATE 1 G/1
1 TABLET ON AN EMPTY STOMACH TABLET ORAL TWICE A DAY
Qty: 180 | Refills: 0 | Status: ACTIVE | COMMUNITY
Start: 2019-03-21 | End: 2023-08-23

## 2023-07-26 RX ORDER — BISOPROLOL FUMARATE 5 MG/1
QD TABLET ORAL
Status: ACTIVE | COMMUNITY
Start: 2013-03-21

## 2023-07-26 RX ORDER — ELECTROLYTES/DEXTROSE
SOLUTION, ORAL ORAL
Status: ACTIVE | COMMUNITY
Start: 2016-06-02

## 2023-07-26 RX ORDER — PANTOPRAZOLE SODIUM 40 MG/1
TAKE 1 TABLET BY MOUTH TABLET, DELAYED RELEASE ORAL TWICE A DAY
Qty: 60 | Refills: 3 | Status: ACTIVE | COMMUNITY
Start: 2019-10-12

## 2023-07-26 RX ORDER — PROMETHAZINE HYDROCHLORIDE 25 MG/1
1 TABLET PO Q6H PRN TABLET ORAL
Status: ACTIVE | COMMUNITY
Start: 2015-07-21

## 2023-07-26 NOTE — HPI-ZZZTODAY'S VISIT
This is a 73-year-old patient with history of cerebral palsy, PUD, reflux and severe esophagitis who presents for follow-up after ER visit at [facility] on 7/24 with nausea, vomiting and coffee-ground emesis.  I have reviewed ER records/labs showing hemoglobin 13.1 which is an improvement from January of this year at which time it was 7.3.  Stool was guaiac positive, but no melena.  She was DCd home with Rx for Reglan 10mg q8, has not yet filled it. She had another episode of coffee ground emesis last night. Stools are dark brown. She has been compliant with BID pantoprazole and Carafate. No NSAIDs or blood thinners. . EGD October 2022: Grade D esophagitis, small HH Enteroscopy at [facility], December 2022: Grade D esophagitis, multiple small gastric polyps, normal duodenum, normal jejunum, PillCam removed from the jejunum Colonoscopy July 2022: Normal

## 2023-07-27 LAB
ABSOLUTE BASOPHILS: 42
ABSOLUTE EOSINOPHILS: 234
ABSOLUTE LYMPHOCYTES: 948
ABSOLUTE MONOCYTES: 486
ABSOLUTE NEUTROPHILS: 4290
BASOPHILS: 0.7
EOSINOPHILS: 3.9
HEMATOCRIT: 42.9
HEMOGLOBIN: 13.3
LYMPHOCYTES: 15.8
MCH: 27.1
MCHC: 31
MCV: 87.4
MONOCYTES: 8.1
MPV: 10.6
NEUTROPHILS: 71.5
PLATELET COUNT: 333
RDW: 14.3
RED BLOOD CELL COUNT: 4.91
WHITE BLOOD CELL COUNT: 6

## 2023-08-10 ENCOUNTER — OUT OF OFFICE VISIT (OUTPATIENT)
Dept: URBAN - METROPOLITAN AREA MEDICAL CENTER 8 | Facility: MEDICAL CENTER | Age: 73
End: 2023-08-10
Payer: MEDICARE

## 2023-08-10 DIAGNOSIS — K92.0 BLOODY EMESIS: ICD-10-CM

## 2023-08-10 DIAGNOSIS — K92.1 ACUTE MELENA: ICD-10-CM

## 2023-08-10 PROCEDURE — 99222 1ST HOSP IP/OBS MODERATE 55: CPT | Performed by: INTERNAL MEDICINE

## 2023-08-10 PROCEDURE — G8427 DOCREV CUR MEDS BY ELIG CLIN: HCPCS | Performed by: INTERNAL MEDICINE

## 2023-08-11 ENCOUNTER — OUT OF OFFICE VISIT (OUTPATIENT)
Dept: URBAN - METROPOLITAN AREA MEDICAL CENTER 8 | Facility: MEDICAL CENTER | Age: 73
End: 2023-08-11
Payer: MEDICARE

## 2023-08-11 DIAGNOSIS — K22.89 DILATATION OF ESOPHAGUS: ICD-10-CM

## 2023-08-11 DIAGNOSIS — K31.89 ACHYLIA: ICD-10-CM

## 2023-08-11 DIAGNOSIS — K92.0 BLOODY EMESIS: ICD-10-CM

## 2023-08-11 DIAGNOSIS — K92.1 ACUTE MELENA: ICD-10-CM

## 2023-08-11 PROCEDURE — 43235 EGD DIAGNOSTIC BRUSH WASH: CPT | Performed by: INTERNAL MEDICINE

## 2023-09-07 ENCOUNTER — OUT OF OFFICE VISIT (OUTPATIENT)
Dept: URBAN - METROPOLITAN AREA MEDICAL CENTER 8 | Facility: MEDICAL CENTER | Age: 73
End: 2023-09-07
Payer: MEDICARE

## 2023-09-07 DIAGNOSIS — K92.0 BLOODY EMESIS: ICD-10-CM

## 2023-09-07 DIAGNOSIS — D64.89 ANEMIA DUE TO OTHER CAUSE: ICD-10-CM

## 2023-09-07 PROCEDURE — G8427 DOCREV CUR MEDS BY ELIG CLIN: HCPCS | Performed by: INTERNAL MEDICINE

## 2023-09-07 PROCEDURE — 99222 1ST HOSP IP/OBS MODERATE 55: CPT | Performed by: INTERNAL MEDICINE

## 2023-09-07 PROCEDURE — 99232 SBSQ HOSP IP/OBS MODERATE 35: CPT | Performed by: INTERNAL MEDICINE

## 2023-09-11 ENCOUNTER — OUT OF OFFICE VISIT (OUTPATIENT)
Dept: URBAN - METROPOLITAN AREA MEDICAL CENTER 8 | Facility: MEDICAL CENTER | Age: 73
End: 2023-09-11
Payer: MEDICARE

## 2023-09-11 DIAGNOSIS — K92.0 BLOODY EMESIS: ICD-10-CM

## 2023-09-11 DIAGNOSIS — D62 ABLA (ACUTE BLOOD LOSS ANEMIA): ICD-10-CM

## 2023-09-11 PROCEDURE — 99232 SBSQ HOSP IP/OBS MODERATE 35: CPT | Performed by: INTERNAL MEDICINE

## 2023-09-12 ENCOUNTER — TELEPHONE ENCOUNTER (OUTPATIENT)
Dept: URBAN - METROPOLITAN AREA CLINIC 27 | Facility: CLINIC | Age: 73
End: 2023-09-12

## 2023-09-24 ENCOUNTER — OUT OF OFFICE VISIT (OUTPATIENT)
Dept: URBAN - METROPOLITAN AREA MEDICAL CENTER 8 | Facility: MEDICAL CENTER | Age: 73
End: 2023-09-24
Payer: MEDICARE

## 2023-09-24 DIAGNOSIS — K92.0 BLOODY EMESIS: ICD-10-CM

## 2023-09-24 PROCEDURE — G8427 DOCREV CUR MEDS BY ELIG CLIN: HCPCS | Performed by: INTERNAL MEDICINE

## 2023-09-24 PROCEDURE — 99222 1ST HOSP IP/OBS MODERATE 55: CPT | Performed by: INTERNAL MEDICINE

## 2023-09-25 ENCOUNTER — OUT OF OFFICE VISIT (OUTPATIENT)
Dept: URBAN - METROPOLITAN AREA MEDICAL CENTER 8 | Facility: MEDICAL CENTER | Age: 73
End: 2023-09-25
Payer: MEDICARE

## 2023-09-25 DIAGNOSIS — R10.84 ABDOMINAL CRAMPING, GENERALIZED: ICD-10-CM

## 2023-09-25 DIAGNOSIS — K92.0 BLOODY EMESIS: ICD-10-CM

## 2023-09-25 PROCEDURE — 99232 SBSQ HOSP IP/OBS MODERATE 35: CPT | Performed by: INTERNAL MEDICINE

## 2023-09-26 ENCOUNTER — OUT OF OFFICE VISIT (OUTPATIENT)
Dept: URBAN - METROPOLITAN AREA MEDICAL CENTER 8 | Facility: MEDICAL CENTER | Age: 73
End: 2023-09-26
Payer: MEDICARE

## 2023-09-26 DIAGNOSIS — K92.0 BLOODY EMESIS: ICD-10-CM

## 2023-09-26 DIAGNOSIS — K22.2 ACQUIRED ESOPHAGEAL RING: ICD-10-CM

## 2023-09-26 DIAGNOSIS — K22.89 DILATATION OF ESOPHAGUS: ICD-10-CM

## 2023-09-26 DIAGNOSIS — K31.89 ACHYLIA: ICD-10-CM

## 2023-09-26 PROCEDURE — 43249 ESOPH EGD DILATION <30 MM: CPT | Performed by: INTERNAL MEDICINE

## 2023-09-26 PROCEDURE — 43239 EGD BIOPSY SINGLE/MULTIPLE: CPT | Performed by: INTERNAL MEDICINE

## 2023-09-28 ENCOUNTER — OUT OF OFFICE VISIT (OUTPATIENT)
Dept: URBAN - METROPOLITAN AREA MEDICAL CENTER 8 | Facility: MEDICAL CENTER | Age: 73
End: 2023-09-28
Payer: MEDICARE

## 2023-09-28 DIAGNOSIS — K92.0 BLOODY EMESIS: ICD-10-CM

## 2023-09-28 DIAGNOSIS — K31.89 ACHYLIA: ICD-10-CM

## 2023-09-28 DIAGNOSIS — K22.89 DILATATION OF ESOPHAGUS: ICD-10-CM

## 2023-09-28 PROCEDURE — 99232 SBSQ HOSP IP/OBS MODERATE 35: CPT | Performed by: INTERNAL MEDICINE

## 2023-10-11 ENCOUNTER — OFFICE VISIT (OUTPATIENT)
Dept: URBAN - METROPOLITAN AREA CLINIC 27 | Facility: CLINIC | Age: 73
End: 2023-10-11

## 2023-10-15 ENCOUNTER — OUT OF OFFICE VISIT (OUTPATIENT)
Dept: URBAN - METROPOLITAN AREA MEDICAL CENTER 8 | Facility: MEDICAL CENTER | Age: 73
End: 2023-10-15
Payer: MEDICARE

## 2023-10-15 DIAGNOSIS — R63.4 ABNORMAL INTENTIONAL WEIGHT LOSS: ICD-10-CM

## 2023-10-15 DIAGNOSIS — K22.89 OTHER SPECIFIED DISEASE OF ESOPHAGUS: ICD-10-CM

## 2023-10-15 DIAGNOSIS — R07.89 ACUTE CHEST WALL PAIN: ICD-10-CM

## 2023-10-15 DIAGNOSIS — R11.11 INTRACTABLE VOMITING WITHOUT NAUSEA: ICD-10-CM

## 2023-10-15 PROCEDURE — 91010 ESOPHAGUS MOTILITY STUDY: CPT | Performed by: INTERNAL MEDICINE

## 2023-11-06 ENCOUNTER — OFFICE VISIT (OUTPATIENT)
Dept: URBAN - METROPOLITAN AREA CLINIC 27 | Facility: CLINIC | Age: 73
End: 2023-11-06
Payer: MEDICARE

## 2023-11-06 ENCOUNTER — DASHBOARD ENCOUNTERS (OUTPATIENT)
Age: 73
End: 2023-11-06

## 2023-11-06 ENCOUNTER — OFFICE VISIT (OUTPATIENT)
Dept: URBAN - METROPOLITAN AREA CLINIC 27 | Facility: CLINIC | Age: 73
End: 2023-11-06

## 2023-11-06 VITALS
SYSTOLIC BLOOD PRESSURE: 143 MMHG | DIASTOLIC BLOOD PRESSURE: 123 MMHG | HEIGHT: 69 IN | WEIGHT: 126 LBS | BODY MASS INDEX: 18.66 KG/M2 | HEART RATE: 96 BPM

## 2023-11-06 DIAGNOSIS — G80.9 CEREBRAL PALSY: ICD-10-CM

## 2023-11-06 DIAGNOSIS — K25.9 GASTRIC ULCER: ICD-10-CM

## 2023-11-06 DIAGNOSIS — K21.9 GASTRO-ESOPHAGEAL REFLUX DISEASE WITHOUT ESOPHAGITIS: ICD-10-CM

## 2023-11-06 PROCEDURE — 99214 OFFICE O/P EST MOD 30 MIN: CPT | Performed by: INTERNAL MEDICINE

## 2023-11-06 RX ORDER — PROMETHAZINE HYDROCHLORIDE 25 MG/1
1 TABLET PO Q6H PRN TABLET ORAL
Status: ACTIVE | COMMUNITY
Start: 2015-07-21

## 2023-11-06 RX ORDER — BISOPROLOL FUMARATE 5 MG/1
QD TABLET ORAL
Status: ACTIVE | COMMUNITY
Start: 2013-03-21

## 2023-11-06 RX ORDER — ELECTROLYTES/DEXTROSE
SOLUTION, ORAL ORAL
Status: ACTIVE | COMMUNITY
Start: 2016-06-02

## 2023-11-06 RX ORDER — PANTOPRAZOLE SODIUM 40 MG/1
TAKE 1 TABLET BY MOUTH TABLET, DELAYED RELEASE ORAL TWICE A DAY
Qty: 60 | Refills: 3 | Status: ACTIVE | COMMUNITY
Start: 2019-10-12

## 2023-11-06 RX ORDER — SUCRALFATE 1 G/10ML
10 ML 1 HOUR BEFORE MEALS AND AT BEDTIME ON AN EMPTY STOMACH SUSPENSION ORAL THREE TIMES A DAY
Qty: 1200 | Refills: 3 | OUTPATIENT
Start: 2023-11-10 | End: 2024-03-09

## 2023-11-06 NOTE — HPI-TODAY'S VISIT:
This is a 73-year-old transgender female seen in follow-up consultation for history of hematemesis and esophagitis.  Manometry revealed absent contractility with a 4 cm hiatal hernia.  She has had multiple admissions to the hospital with hematemesis and coffee-ground emesis.  Sometimes requiring transfusion.  Has been doing better lately.  There was a stricture as well food is now blended and no episodes of vomiting.  Taking Protonix twice a day.  The sucralfate liquid 3 times a day helps a lot.  Normal colon in July 2022.  Overall doing a little bit better over the past month

## 2024-09-17 ENCOUNTER — TELEPHONE ENCOUNTER (OUTPATIENT)
Dept: URBAN - METROPOLITAN AREA CLINIC 27 | Facility: CLINIC | Age: 74
End: 2024-09-17

## 2024-09-17 ENCOUNTER — CLAIMS CREATED FROM THE CLAIM WINDOW (OUTPATIENT)
Dept: URBAN - METROPOLITAN AREA MEDICAL CENTER 8 | Facility: MEDICAL CENTER | Age: 74
End: 2024-09-17
Payer: MEDICARE

## 2024-09-17 DIAGNOSIS — R93.3 ABN FINDINGS-GI TRACT: ICD-10-CM

## 2024-09-17 DIAGNOSIS — K56.7 ILEUS: ICD-10-CM

## 2024-09-17 DIAGNOSIS — R11.2 NAUSEA AND VOMITING: ICD-10-CM

## 2024-09-17 PROCEDURE — G8427 DOCREV CUR MEDS BY ELIG CLIN: HCPCS | Performed by: INTERNAL MEDICINE

## 2024-09-17 PROCEDURE — 99253 IP/OBS CNSLTJ NEW/EST LOW 45: CPT | Performed by: INTERNAL MEDICINE

## 2024-09-17 PROCEDURE — 99221 1ST HOSP IP/OBS SF/LOW 40: CPT | Performed by: INTERNAL MEDICINE

## 2024-09-18 ENCOUNTER — CLAIMS CREATED FROM THE CLAIM WINDOW (OUTPATIENT)
Dept: URBAN - METROPOLITAN AREA MEDICAL CENTER 8 | Facility: MEDICAL CENTER | Age: 74
End: 2024-09-18
Payer: MEDICARE

## 2024-09-18 DIAGNOSIS — K56.41 FECAL IMPACTION: ICD-10-CM

## 2024-09-18 DIAGNOSIS — K56.7 ILEUS: ICD-10-CM

## 2024-09-18 PROCEDURE — 99232 SBSQ HOSP IP/OBS MODERATE 35: CPT | Performed by: PHYSICIAN ASSISTANT

## 2025-01-24 ENCOUNTER — CLAIMS CREATED FROM THE CLAIM WINDOW (OUTPATIENT)
Dept: URBAN - METROPOLITAN AREA MEDICAL CENTER 8 | Facility: MEDICAL CENTER | Age: 75
End: 2025-01-24

## 2025-01-24 PROCEDURE — 99254 IP/OBS CNSLTJ NEW/EST MOD 60: CPT | Performed by: INTERNAL MEDICINE

## 2025-01-24 PROCEDURE — 43235 EGD DIAGNOSTIC BRUSH WASH: CPT | Performed by: INTERNAL MEDICINE

## 2025-01-25 ENCOUNTER — CLAIMS CREATED FROM THE CLAIM WINDOW (OUTPATIENT)
Dept: URBAN - METROPOLITAN AREA MEDICAL CENTER 8 | Facility: MEDICAL CENTER | Age: 75
End: 2025-01-25

## 2025-01-25 PROCEDURE — 99232 SBSQ HOSP IP/OBS MODERATE 35: CPT | Performed by: INTERNAL MEDICINE

## 2025-01-26 ENCOUNTER — CLAIMS CREATED FROM THE CLAIM WINDOW (OUTPATIENT)
Dept: URBAN - METROPOLITAN AREA MEDICAL CENTER 8 | Facility: MEDICAL CENTER | Age: 75
End: 2025-01-26

## 2025-01-26 PROCEDURE — 99232 SBSQ HOSP IP/OBS MODERATE 35: CPT | Performed by: INTERNAL MEDICINE

## 2025-01-27 ENCOUNTER — CLAIMS CREATED FROM THE CLAIM WINDOW (OUTPATIENT)
Dept: URBAN - METROPOLITAN AREA MEDICAL CENTER 8 | Facility: MEDICAL CENTER | Age: 75
End: 2025-01-27

## 2025-01-27 PROCEDURE — 99232 SBSQ HOSP IP/OBS MODERATE 35: CPT | Performed by: INTERNAL MEDICINE

## 2025-02-01 ENCOUNTER — P2P PATIENT RECORD (OUTPATIENT)
Age: 75
End: 2025-02-01

## 2025-02-06 ENCOUNTER — OFFICE VISIT (OUTPATIENT)
Dept: URBAN - METROPOLITAN AREA CLINIC 27 | Facility: CLINIC | Age: 75
End: 2025-02-06

## 2025-02-28 ENCOUNTER — OFFICE VISIT (OUTPATIENT)
Dept: URBAN - METROPOLITAN AREA CLINIC 27 | Facility: CLINIC | Age: 75
End: 2025-02-28

## 2025-03-26 ENCOUNTER — OFFICE VISIT (OUTPATIENT)
Dept: URBAN - METROPOLITAN AREA CLINIC 27 | Facility: CLINIC | Age: 75
End: 2025-03-26

## 2025-04-15 ENCOUNTER — ERX REFILL RESPONSE (OUTPATIENT)
Dept: URBAN - METROPOLITAN AREA CLINIC 27 | Facility: CLINIC | Age: 75
End: 2025-04-15

## 2025-04-15 RX ORDER — PANTOPRAZOLE SODIUM 40 MG/1
TAKE 1 TABLET BY MOUTH TWICE A DAY TABLET, DELAYED RELEASE ORAL
Qty: 180 TABLET | Refills: 3 | OUTPATIENT

## 2025-04-29 ENCOUNTER — OFFICE VISIT (OUTPATIENT)
Dept: URBAN - METROPOLITAN AREA CLINIC 27 | Facility: CLINIC | Age: 75
End: 2025-04-29

## 2025-04-29 RX ORDER — PROMETHAZINE HYDROCHLORIDE 25 MG/1
1 TABLET PO Q6H PRN TABLET ORAL
Status: ACTIVE | COMMUNITY
Start: 2015-07-21

## 2025-04-29 RX ORDER — PANTOPRAZOLE SODIUM 40 MG/1
TAKE 1 TABLET BY MOUTH TWICE A DAY TABLET, DELAYED RELEASE ORAL
Qty: 180 TABLET | Refills: 3 | Status: ACTIVE | COMMUNITY

## 2025-04-29 RX ORDER — ELECTROLYTES/DEXTROSE
SOLUTION, ORAL ORAL
Status: ACTIVE | COMMUNITY
Start: 2016-06-02

## 2025-04-29 RX ORDER — BISOPROLOL FUMARATE 5 MG/1
QD TABLET ORAL
Status: ACTIVE | COMMUNITY
Start: 2013-03-21

## 2025-04-29 RX ORDER — SUCRALFATE ORAL 1 G/10ML
TAKE 10 ML 1 HOUR BEFORE MEALS AND AT BEDTIME ON AN EMPTY STOMACH ORALLY THREE TIMES A DAY 30 DAYS SUSPENSION ORAL
Qty: 3600 MILLILITER | Refills: 1 | Status: ACTIVE | COMMUNITY

## 2025-04-29 NOTE — HPI-TODAY'S VISIT:
This is a 73-year-old transgender female seen in follow-up consultation for history of hematemesis and esophagitis.  Manometry revealed absent contractility with a 4 cm hiatal hernia.  She has had multiple admissions to the hospital with hematemesis and coffee-ground emesis.  Sometimes requiring transfusion.  Has been doing better lately.  There was a stricture as well food is now blended and no episodes of vomiting.  Taking Protonix twice a day.  The sucralfate liquid 3 times a day helps a lot.  Normal colon in July 2022.  Overall doing a little bit better over the past month   [facility], [facility] with stercoral colitis requiring surgical disimpaction, E coli bacteremia, UTI, UGIB.  Labs this month show low iron, ferritin 10, normal CMP, Hgb 8.3 on 4/21 CT ab/pel on 3/19: fecal impaction with stercoral colitis, stool throughout the colon, distal esophageal wall thickening Colonoscopy 3/6/25: fair prep, 1 small polyp, recall 1 yr EGD small bowel enteroscopy 3/6/25: LA grd C esophagitis, Schatzki's ring, 3cm HH, gastric polyps, gastritis, duodenitis  EGD earlier this yr sshowed loose appearing fundoplication. Barium swallow inpt at [facility] was incopmlete, repeat as outpt. PPI BID, sucralfate

## 2025-05-01 ENCOUNTER — OFFICE VISIT (OUTPATIENT)
Dept: URBAN - METROPOLITAN AREA CLINIC 27 | Facility: CLINIC | Age: 75
End: 2025-05-01
Payer: MEDICARE

## 2025-05-01 DIAGNOSIS — K21.9 CHRONIC GERD: ICD-10-CM

## 2025-05-01 DIAGNOSIS — K20.90 ESOPHAGITIS: ICD-10-CM

## 2025-05-01 DIAGNOSIS — G80.9 CEREBRAL PALSY, UNSPECIFIED: ICD-10-CM

## 2025-05-01 PROCEDURE — 99213 OFFICE O/P EST LOW 20 MIN: CPT | Performed by: PHYSICIAN ASSISTANT

## 2025-05-01 RX ORDER — ELECTROLYTES/DEXTROSE
SOLUTION, ORAL ORAL
Status: ACTIVE | COMMUNITY
Start: 2016-06-02

## 2025-05-01 RX ORDER — SUCRALFATE ORAL 1 G/10ML
TAKE 10 ML 1 HOUR BEFORE MEALS AND AT BEDTIME ON AN EMPTY STOMACH ORALLY THREE TIMES A DAY 30 DAYS SUSPENSION ORAL
Qty: 3600 MILLILITER | Refills: 1 | Status: ACTIVE | COMMUNITY

## 2025-05-01 RX ORDER — PROMETHAZINE HYDROCHLORIDE 25 MG/1
1 TABLET PO Q6H PRN TABLET ORAL
Status: ACTIVE | COMMUNITY
Start: 2015-07-21

## 2025-05-01 RX ORDER — BISOPROLOL FUMARATE 5 MG/1
QD TABLET ORAL
Status: ACTIVE | COMMUNITY
Start: 2013-03-21

## 2025-05-01 RX ORDER — PANTOPRAZOLE SODIUM 40 MG/1
TAKE 1 TABLET BY MOUTH TWICE A DAY TABLET, DELAYED RELEASE ORAL
Qty: 180 TABLET | Refills: 3 | Status: ACTIVE | COMMUNITY

## 2025-05-01 NOTE — HPI-TODAY'S VISIT:
Effie Fernandez is a 73-year-old transgender female seen in follow-up for history of hematemesis and esophagitis.  Manometry revealed absent contractility with a 4 cm hiatal hernia.  She has had multiple admissions to the hospital with hematemesis and coffee-ground emesis.  Sometimes requiring transfusion.  Has been doing better lately.  She presents today with a caregiver who denies nausea, vomiting, abd pain, diarrhea, melena or hematochezia.  She takes takes Protonix twice a day.  Normal colon in July 2022.   Hospitalized 3/19 through 4/1 at [facility] with pyuria, UTI. Hospitalized 2/27 through 3/8 at [facility] with stercoral colitis requiring surgical disimpaction, E coli bacteremia, UTI, UGIB.  Labs this month show low iron, ferritin 10, normal CMP, Hgb 8.3 on 4/21 CT ab/pel on 3/19: fecal impaction with stercoral colitis, stool throughout the colon, distal esophageal wall thickening Colonoscopy 3/6/25: fair prep, 1 small polyp, recall 1 yr EGD small bowel enteroscopy 3/6/25: LA grd C esophagitis, Schatzki's ring, 3cm HH, gastric polyps, gastritis, duodenitis  EGD earlier this showed loose appearing fundoplication.

## 2025-05-02 PROBLEM — 235595009: Status: ACTIVE | Noted: 2025-05-02

## 2025-05-05 ENCOUNTER — TELEPHONE ENCOUNTER (OUTPATIENT)
Dept: URBAN - METROPOLITAN AREA CLINIC 27 | Facility: CLINIC | Age: 75
End: 2025-05-05

## 2025-05-05 RX ORDER — PANTOPRAZOLE SODIUM 40 MG/1
1 TABLET TABLET, DELAYED RELEASE ORAL DAILY
Qty: 90 TABLET | Refills: 3

## 2025-08-05 ENCOUNTER — OFFICE VISIT (OUTPATIENT)
Dept: URBAN - METROPOLITAN AREA CLINIC 27 | Facility: CLINIC | Age: 75
End: 2025-08-05